# Patient Record
Sex: FEMALE | Race: ASIAN | NOT HISPANIC OR LATINO
[De-identification: names, ages, dates, MRNs, and addresses within clinical notes are randomized per-mention and may not be internally consistent; named-entity substitution may affect disease eponyms.]

---

## 2018-04-18 PROBLEM — Z00.00 ENCOUNTER FOR PREVENTIVE HEALTH EXAMINATION: Status: ACTIVE | Noted: 2018-04-18

## 2018-05-07 ENCOUNTER — RECORD ABSTRACTING (OUTPATIENT)
Age: 53
End: 2018-05-07

## 2018-05-18 ENCOUNTER — TRANSCRIPTION ENCOUNTER (OUTPATIENT)
Age: 53
End: 2018-05-18

## 2018-05-18 ENCOUNTER — APPOINTMENT (OUTPATIENT)
Dept: BREAST CENTER | Facility: CLINIC | Age: 53
End: 2018-05-18
Payer: COMMERCIAL

## 2018-05-18 VITALS
HEART RATE: 65 BPM | WEIGHT: 115 LBS | DIASTOLIC BLOOD PRESSURE: 70 MMHG | SYSTOLIC BLOOD PRESSURE: 103 MMHG | BODY MASS INDEX: 19.63 KG/M2 | HEIGHT: 64 IN

## 2018-05-18 PROCEDURE — 99215 OFFICE O/P EST HI 40 MIN: CPT

## 2018-05-18 RX ORDER — OLOPATADINE HYDROCHLORIDE 2 MG/ML
0.2 SOLUTION OPHTHALMIC
Qty: 2 | Refills: 0 | Status: DISCONTINUED | COMMUNITY
Start: 2018-05-11

## 2018-05-18 RX ORDER — OLOPATADINE HCL 1 MG/ML
0.1 SOLUTION/ DROPS OPHTHALMIC
Qty: 5 | Refills: 0 | Status: DISCONTINUED | COMMUNITY
Start: 2018-05-11

## 2019-05-17 ENCOUNTER — APPOINTMENT (OUTPATIENT)
Dept: BREAST CENTER | Facility: CLINIC | Age: 54
End: 2019-05-17
Payer: COMMERCIAL

## 2019-05-17 VITALS
HEIGHT: 64 IN | HEART RATE: 71 BPM | DIASTOLIC BLOOD PRESSURE: 68 MMHG | WEIGHT: 115 LBS | SYSTOLIC BLOOD PRESSURE: 95 MMHG | BODY MASS INDEX: 19.63 KG/M2

## 2019-05-17 DIAGNOSIS — Z98.890 OTHER SPECIFIED POSTPROCEDURAL STATES: ICD-10-CM

## 2019-05-17 PROCEDURE — 99215 OFFICE O/P EST HI 40 MIN: CPT

## 2019-05-17 NOTE — PHYSICAL EXAM
[Atraumatic] : atraumatic [Supple] : supple [Normocephalic] : normocephalic [Examined in the supine and seated position] : examined in the supine and seated position [No Supraclavicular Adenopathy] : no supraclavicular adenopathy [No dominant masses] : no dominant masses in right breast  [No dominant masses] : no dominant masses left breast [No Nipple Discharge] : no left nipple discharge [No Nipple Retraction] : no left nipple retraction [No Axillary Lymphadenopathy] : no right axillary lymphadenopathy [No Edema] : no edema [No Rashes] : no rashes [No Ulceration] : no ulceration [de-identified] : healed surgical excision transverse 12:00

## 2019-05-17 NOTE — HISTORY OF PRESENT ILLNESS
[FreeTextEntry1] : This is a 55 yo female referred for high risk. Patient is s/p left retroareolar 7:00  usd guided biopsy (coil clip) on 07/18/2017. Pathology showed benign breast tissue with benign sclerotic ducts showing focal surrounding chronic inflammation. Patient is s/p left breast biopsy in 2001 ([facility]). Patient states this was benign. Pathology report that available at this time. Patient has a family history of mother with BCA (>50) and uterine CA, and also a paternal aunt with BCA (>50).\par \par Patient has no breast complaints today.  \par \par She does SBE. \par She has not noticed a change in her breast or a breast lump.\par She has not noticed a change in her nipple or nipple area.\par She has not noticed a change in the skin of the breast.\par She is not experiencing nipple discharge.\par She is not experiencing breast pain.\par She has not noticed a lump or lymph node under the armpit. \par \par BREAST CANCER RISK FACTORS\par Menarche: 15\par Date of LMP: 01/2017 \par Menopause: post \par Grav:   1   Para: 1\par Age at first live birth: 38\par Nursed: No \par Hysterectomy: No \par Oophorectomy: No \par OCP: No \par HRT: No \par Last pap/pelvic exam: 6/2018 WNL \par Related family history: mother BCA  >50& uterine CA, paternal aunt BCA  >50\par Ashkenazi: No \par Mastery risk assessment: BRCAPRO 6.3%TCv6 19.5%TCv7 26.7% Debbie 22.3% Oliver  9.2%\par BRCA testing: No \par Bra size:  A\par \par Last mammogram:   7/20/2018                           Location: [place] \par Report reviewed.                                 Images reviewed.\par Results: Birads 2 \par Heterogenously dense breast tissue with no mammographic evidence of malignancy. \par \par Last ultrasound: 7/20/2018    Location: [place] \par Report reviewed.                                 Images reviewed.\par Results: Birads 2\par Interval resolution of the previously biopsied hypoechoic lesion at 7:00 in the left retroareolar region. \par \par Last MRI:   never

## 2019-05-17 NOTE — ASSESSMENT
[FreeTextEntry1] : I reviewed her risk status and she is high risk given her Debbie model greater than 20%. I reviewed our high risk screening program including 6 month CBE (which we can alternate with her gyn visit) and 6 month breast imaging (mg/sono alternating with MRI). I reviewed that screening MRI is controversial and some institutions do not offer them given some studies showing no overall survival benefit and high false positive rate. She is interested in MRI, but will think about it. And would like to do annual MG/MRI.  \par She is scheduled for this JUL 2019\par recalculate risk at 57yo\par If negative, she will see gyn 6 months and me in 1 year.\par  I believe I was able to answer all of her questions to her satisfaction.  She knows to call or return sooner should any changes or concerns arise.\par

## 2020-07-17 ENCOUNTER — APPOINTMENT (OUTPATIENT)
Dept: BREAST CENTER | Facility: CLINIC | Age: 55
End: 2020-07-17
Payer: COMMERCIAL

## 2020-07-17 VITALS
HEART RATE: 64 BPM | BODY MASS INDEX: 19.63 KG/M2 | HEIGHT: 64 IN | WEIGHT: 115 LBS | SYSTOLIC BLOOD PRESSURE: 98 MMHG | DIASTOLIC BLOOD PRESSURE: 60 MMHG

## 2020-07-17 DIAGNOSIS — Z80.1 FAMILY HISTORY OF MALIGNANT NEOPLASM OF TRACHEA, BRONCHUS AND LUNG: ICD-10-CM

## 2020-07-17 DIAGNOSIS — Z12.31 ENCOUNTER FOR SCREENING MAMMOGRAM FOR MALIGNANT NEOPLASM OF BREAST: ICD-10-CM

## 2020-07-17 PROCEDURE — 99215 OFFICE O/P EST HI 40 MIN: CPT

## 2020-07-17 NOTE — ASSESSMENT
[FreeTextEntry1] : I reviewed her risk status and she is high risk given her Debbie model greater than 20%. I reviewed our high risk screening program including 6 month CBE (which we can alternate with her gyn visit) and 6 month breast imaging (mg/sono alternating with MRI). I reviewed that screening MRI is controversial and some institutions do not offer them given some studies showing no overall survival benefit and high false positive rate. She is interested in MRI, but will think about it. And would like to do annual MG/MRI.  \par She is scheduled for this JUL 2020\par recalculate risk at 55yo\par If negative, she will see gyn 6 months and me in 1 year.\par  I believe I was able to answer all of her questions to her satisfaction.  She knows to call or return sooner should any changes or concerns arise.\par

## 2020-07-17 NOTE — HISTORY OF PRESENT ILLNESS
[FreeTextEntry1] : This is a 56 yo female referred for high risk. Patient is s/p left retroareolar 7:00  usd guided biopsy (coil clip) on 07/18/2017. Pathology showed benign breast tissue with benign sclerotic ducts showing focal surrounding chronic inflammation. Patient is s/p left breast biopsy in 2001 ([facility]). Patient states this was benign. Pathology report that available at this time. Patient has a family history of mother with BCA (>50) and uterine CA, and also a paternal aunt with BCA (>50).\par \par Patient has no breast complaints today.  \par \par She does SBE. \par She has not noticed a change in her breast or a breast lump.\par She has not noticed a change in her nipple or nipple area.\par She has not noticed a change in the skin of the breast.\par She is not experiencing nipple discharge.\par She is not experiencing breast pain.\par She has not noticed a lump or lymph node under the armpit. \par \par BREAST CANCER RISK FACTORS\par Menarche: 15\par Date of LMP: 01/2017 \par Menopause: post \par Grav:   1   Para: 1\par Age at first live birth: 38\par Nursed: No \par Hysterectomy: No \par Oophorectomy: No \par OCP: No \par HRT: No \par Last pap/pelvic exam: 6/2019 WNL \par Related family history: mother BCA  >50& uterine CA, paternal aunt BCA  >50\par Ashkenazi: No \par Mastery risk assessment: BRCAPRO 6.3%TCv6 19.5%TCv7 26.7% Debbie 22.3% Oliver  9.2%\par BRCA testing: No \par Bra size:  A\par \par Last mammogram:   7/19/2019                           Location: [place] \par Report reviewed.                                 Images reviewed.\par Results: Birads 2 \par Heterogenously dense breast tissue with no mammographic evidence of malignancy. \par \par Last ultrasound: 7/19/2019    Location: [place] \par Report reviewed.                                 Images reviewed.\par Results: Birads 2\par Heterogeneously dense breast tissue with no mammographic evidence of malignancy.\par \par Last MRI:   7/19/2019                         Location: [place] \par Results: BIRADS 2\par No suspicious MRI abnormality.

## 2020-07-17 NOTE — PHYSICAL EXAM
[Atraumatic] : atraumatic [Normocephalic] : normocephalic [No Supraclavicular Adenopathy] : no supraclavicular adenopathy [Supple] : supple [No dominant masses] : no dominant masses left breast [Examined in the supine and seated position] : examined in the supine and seated position [No dominant masses] : no dominant masses in right breast  [No Nipple Retraction] : no left nipple retraction [No Nipple Discharge] : no left nipple discharge [No Axillary Lymphadenopathy] : no left axillary lymphadenopathy [No Edema] : no edema [No Rashes] : no rashes [No Ulceration] : no ulceration [Symmetrical] : symmetrical [de-identified] : healed surgical excision transverse 12:00 [de-identified] : right nipple has less projection than left

## 2020-12-23 PROBLEM — Z12.31 ENCOUNTER FOR SCREENING MAMMOGRAM FOR MALIGNANT NEOPLASM OF BREAST: Status: RESOLVED | Noted: 2018-05-07 | Resolved: 2020-12-23

## 2021-05-06 ENCOUNTER — NON-APPOINTMENT (OUTPATIENT)
Age: 56
End: 2021-05-06

## 2021-07-23 ENCOUNTER — APPOINTMENT (OUTPATIENT)
Dept: BREAST CENTER | Facility: CLINIC | Age: 56
End: 2021-07-23
Payer: COMMERCIAL

## 2021-07-23 VITALS
BODY MASS INDEX: 19.63 KG/M2 | DIASTOLIC BLOOD PRESSURE: 76 MMHG | SYSTOLIC BLOOD PRESSURE: 128 MMHG | HEART RATE: 65 BPM | WEIGHT: 115 LBS | HEIGHT: 64 IN

## 2021-07-23 DIAGNOSIS — Z12.39 ENCOUNTER FOR OTHER SCREENING FOR MALIGNANT NEOPLASM OF BREAST: ICD-10-CM

## 2021-07-23 PROCEDURE — 99072 ADDL SUPL MATRL&STAF TM PHE: CPT

## 2021-07-23 PROCEDURE — 99214 OFFICE O/P EST MOD 30 MIN: CPT

## 2021-07-23 NOTE — PHYSICAL EXAM
[Normocephalic] : normocephalic [Atraumatic] : atraumatic [Supple] : supple [No Supraclavicular Adenopathy] : no supraclavicular adenopathy [Examined in the supine and seated position] : examined in the supine and seated position [Symmetrical] : symmetrical [No dominant masses] : no dominant masses in right breast  [No dominant masses] : no dominant masses left breast [No Nipple Retraction] : no left nipple retraction [No Nipple Discharge] : no left nipple discharge [No Axillary Lymphadenopathy] : no left axillary lymphadenopathy [No Edema] : no edema [No Rashes] : no rashes [No Ulceration] : no ulceration [de-identified] : healed surgical excision transverse 12:00 [de-identified] : right nipple has less projection than left

## 2021-07-23 NOTE — HISTORY OF PRESENT ILLNESS
[FreeTextEntry1] : This is a 55 yo female referred for high risk. Patient is s/p left retroareolar 7:00  usd guided biopsy (coil clip) on 07/18/2017. Pathology showed benign breast tissue with benign sclerotic ducts showing focal surrounding chronic inflammation. Patient is s/p left breast biopsy in 2001 ([facility]). Patient states this was benign. Pathology report that available at this time. Patient has a family history of mother with BCA (>50) and uterine CA, and also a paternal aunt with BCA (>50).\par \par Patient has no breast complaints today.  She completed Pfizer left April 2021. Her daughter is starting at [employer] [facility] in the Fall\par \par She does SBE. \par She has not noticed a change in her breast or a breast lump.\par She has not noticed a change in her nipple or nipple area.\par She has not noticed a change in the skin of the breast.\par She is not experiencing nipple discharge.\par She is not experiencing breast pain.\par She has not noticed a lump or lymph node under the armpit. \par \par BREAST CANCER RISK FACTORS\par Menarche: 15\par Date of LMP: 01/2017 \par Menopause: post \par Grav:   1   Para: 1\par Age at first live birth: 38\par Nursed: No \par Hysterectomy: No \par Oophorectomy: No \par OCP: No \par HRT: No \par Last pap/pelvic exam: 6/2019 WNL \par Related family history: mother BCA  >50& uterine CA, paternal aunt BCA  >50\par Ashkenazi: No \par Mastery risk assessment: BRCAPRO 6.3%TCv6 19.5%TCv7 26.7% Debbie 22.3% Oliver  9.2%\par BRCA testing: No \par Bra size:  A\par \par Last mammogram:   8/26/2020             Location: [place] \par Report reviewed.                                 Images reviewed.\par Results: Birads 2 \par Heterogenously dense breast tissue with no mammographic evidence of malignancy. \par \par Last ultrasound: 7/20/2018    Location: [place] \par Report reviewed.                                 Images reviewed.\par Results: Birads 2\par Heterogeneously dense breast tissue with no mammographic evidence of malignancy.\par \par Last MRI:   8/26/2020                         Location[place] \par Results: BIRADS 2\par No suspicious MRI findings.

## 2021-07-23 NOTE — ASSESSMENT
[FreeTextEntry1] : I reviewed her risk status and she is high risk given her Debbie model greater than 20%. I reviewed our high risk screening program including 6 month CBE (which we can alternate with her gyn visit) and 6 month breast imaging (mg/sono alternating with MRI). I reviewed that screening MRI is controversial and some institutions do not offer them given some studies showing no overall survival benefit and high false positive rate. She is interested in MRI, but will think about it. And would like to do annual MG/MRI.  \par She is scheduled for this MRI 8/2021\par recalculate risk at 57yo\par If negative, she will see gyn 6 months and me in 1 year.\par  I believe I was able to answer all of her questions to her satisfaction.  She knows to call or return sooner should any changes or concerns arise.\par

## 2021-09-23 ENCOUNTER — NON-APPOINTMENT (OUTPATIENT)
Age: 56
End: 2021-09-23

## 2022-09-16 ENCOUNTER — APPOINTMENT (OUTPATIENT)
Dept: BREAST CENTER | Facility: CLINIC | Age: 57
End: 2022-09-16

## 2022-09-16 ENCOUNTER — NON-APPOINTMENT (OUTPATIENT)
Age: 57
End: 2022-09-16

## 2022-09-16 VITALS
HEART RATE: 58 BPM | DIASTOLIC BLOOD PRESSURE: 67 MMHG | HEIGHT: 64 IN | BODY MASS INDEX: 19.63 KG/M2 | WEIGHT: 115 LBS | SYSTOLIC BLOOD PRESSURE: 102 MMHG

## 2022-09-16 DIAGNOSIS — R92.8 OTHER ABNORMAL AND INCONCLUSIVE FINDINGS ON DIAGNOSTIC IMAGING OF BREAST: ICD-10-CM

## 2022-09-16 DIAGNOSIS — Z12.31 ENCOUNTER FOR SCREENING MAMMOGRAM FOR MALIGNANT NEOPLASM OF BREAST: ICD-10-CM

## 2022-09-16 PROCEDURE — 99214 OFFICE O/P EST MOD 30 MIN: CPT

## 2022-09-16 NOTE — PHYSICAL EXAM
[Normocephalic] : normocephalic [Atraumatic] : atraumatic [Supple] : supple [No Supraclavicular Adenopathy] : no supraclavicular adenopathy [Examined in the supine and seated position] : examined in the supine and seated position [Symmetrical] : symmetrical [No dominant masses] : no dominant masses in right breast  [No dominant masses] : no dominant masses left breast [No Nipple Retraction] : no left nipple retraction [No Nipple Discharge] : no left nipple discharge [No Axillary Lymphadenopathy] : no left axillary lymphadenopathy [No Edema] : no edema [No Rashes] : no rashes [No Ulceration] : no ulceration [de-identified] : healed surgical excision transverse 12:00 [de-identified] : right nipple has less projection than left

## 2022-09-16 NOTE — HISTORY OF PRESENT ILLNESS
[FreeTextEntry1] : This is a 56 yo female followed for high risk. Patient is s/p left retroareolar 7:00  usd guided biopsy (coil clip) on 07/18/2017. Pathology showed benign breast tissue with benign sclerotic ducts showing focal surrounding chronic inflammation. Patient is s/p left breast biopsy in 2001 ([facility]). Patient states this was benign. Pathology report that available at this time. Patient has a family history of mother with BCA (>50) and uterine CA, and also a paternal aunt with BCA (>50).\par \par She completed Pfizer left April 2021. Patient denies any new medical history. No new family history of cancer. Patient has no breast complaints today. Patient is due for her annual breast screening.  Patient has been doing well with the MRI/mammogram, however is concerned about prolonged screening with MRIs. Patient's last physical was last year with Dr. Finley - [facility]. \par \par She does SBE. \par She has not noticed a change in her breast or a breast lump.\par She has not noticed a change in her nipple or nipple area.\par She has not noticed a change in the skin of the breast.\par She is not experiencing nipple discharge.\par She is not experiencing breast pain.\par She has not noticed a lump or lymph node under the armpit. \par \par BREAST CANCER RISK FACTORS\par Menarche: 15\par Date of LMP: 01/2017 \par Menopause: post \par Grav:   1   Para: 1\par Age at first live birth: 38\par Nursed: No \par Hysterectomy: No \par Oophorectomy: No \par OCP: No \par HRT: No \par Last pap/pelvic exam: 6/2019 WNL \par Related family history: mother BCA  >50& uterine CA, paternal aunt BCA  >50\par Ashkenazi: No \par Mastery risk assessment: BRCAPRO 6.8% TCv7 24.1 TCv8 21.5% Debbie 20.7% Oliver  7.8%\par BRCA testing: No \par Bra size:  A\par \par Last mammogram: 9/10/2021             Location: [place]\par Report reviewed.                                 Images reviewed.\par Results: Birads 2 \par Heterogenously dense breast tissue with no mammographic evidence of malignancy. \par \par Last ultrasound: 7/20/2018    Location: [place] \par Report reviewed.                                 Images reviewed.\par Results: Birads 2\par Heterogeneously dense breast tissue with no mammographic evidence of malignancy.\par \par Last MRI:   9/10/2021                    Location: [place] \par Results: BIRADS 2\par No suspicious MRI findings.

## 2022-09-16 NOTE — ASSESSMENT
[FreeTextEntry1] : I reviewed her risk status and she is high risk given her Debbie model greater than 20%.  Patient is amenable to continuing the MRI for now. Reviewed MRI protocol, risks, etc.\par Patient would like to continue the MRI/mammogram yearly imaging - due for Sept 2022\par Will recheck Mastery in next year, but discussed how it may not decrease until she is 61\par Patient will continue 6 month CBE (which we can alternate with her gyn visit). \par \par She will see gyn 6 months and me in 1 year.\par I believe I was able to answer all of her questions to her satisfaction.  She knows to call or return sooner should any changes or concerns arise.\par

## 2022-09-26 ENCOUNTER — RESULT REVIEW (OUTPATIENT)
Age: 57
End: 2022-09-26

## 2022-10-05 ENCOUNTER — NON-APPOINTMENT (OUTPATIENT)
Age: 57
End: 2022-10-05

## 2022-10-09 ENCOUNTER — RESULT REVIEW (OUTPATIENT)
Age: 57
End: 2022-10-09

## 2022-10-10 ENCOUNTER — NON-APPOINTMENT (OUTPATIENT)
Age: 57
End: 2022-10-10

## 2023-05-17 ENCOUNTER — NON-APPOINTMENT (OUTPATIENT)
Age: 58
End: 2023-05-17

## 2023-06-08 ENCOUNTER — APPOINTMENT (OUTPATIENT)
Dept: DERMATOLOGY | Facility: CLINIC | Age: 58
End: 2023-06-08
Payer: COMMERCIAL

## 2023-06-08 VITALS — HEIGHT: 64 IN | BODY MASS INDEX: 19.63 KG/M2 | WEIGHT: 115 LBS

## 2023-06-08 DIAGNOSIS — D23.9 OTHER BENIGN NEOPLASM OF SKIN, UNSPECIFIED: ICD-10-CM

## 2023-06-08 DIAGNOSIS — Z12.83 ENCOUNTER FOR SCREENING FOR MALIGNANT NEOPLASM OF SKIN: ICD-10-CM

## 2023-06-08 DIAGNOSIS — L57.8 OTHER SKIN CHANGES DUE TO CHRONIC EXPOSURE TO NONIONIZING RADIATION: ICD-10-CM

## 2023-06-08 DIAGNOSIS — D18.01 HEMANGIOMA OF SKIN AND SUBCUTANEOUS TISSUE: ICD-10-CM

## 2023-06-08 PROCEDURE — 99203 OFFICE O/P NEW LOW 30 MIN: CPT

## 2023-06-08 RX ORDER — TRETINOIN 0.25 MG/G
0.03 CREAM TOPICAL
Qty: 1 | Refills: 6 | Status: ACTIVE | COMMUNITY
Start: 2023-06-08 | End: 1900-01-01

## 2023-06-08 NOTE — HISTORY OF PRESENT ILLNESS
[FreeTextEntry1] : Moles [de-identified] : # Mole/skin check \par Concerns today:brown spots throughout body\par Sunscreen use:sometimes\par Hx of blistering sun burns:no\par Hx of immunosuppression:no\par Personal history of skin cancer:no\par Family history of skin cancer:paternal grandfather do not remember what type\par \par Also requesting Rx for tretinoin

## 2023-06-08 NOTE — ASSESSMENT
[FreeTextEntry1] : # DPNs\par - Benign, reassured\par - Discussed cosmetic LN if desired; /session\par - Test spot performed to mid back lesion, setting 0.8 \par - R/B/E discussed including risk of dyspigmentation and scarring\par \par # Cherry angiomas\par # Dermatofibroma\par - Benign, reassured\par \par # Photoaging\par - Tretinoin 0.025% three times weekly\par - Discussed potential irritation/dryness\par - Advised to wear moisturizer\par \par # Skin cancer screening\par - A complete skin exam was performed with exception of toenails due to polish\par - No concerning lesions identified\par - Photoprotection was discussed including sunscreen\par - ABCDEs of melanoma reviewed\par - Call for new or changing lesions\par

## 2023-06-08 NOTE — PHYSICAL EXAM
[Alert] : alert [Oriented x 3] : ~L oriented x 3 [Full Body Skin Exam Performed] : performed [FreeTextEntry3] : Pt consented to examination of external genitalia and buttocks\par Opaque polish on toenails\par \par Small erythematous papules scattered\par Stuck on brown papules on neck and back scattered\par Brown firm papule on the L shin\par \par

## 2023-06-29 ENCOUNTER — APPOINTMENT (OUTPATIENT)
Dept: DERMATOLOGY | Facility: CLINIC | Age: 58
End: 2023-06-29
Payer: COMMERCIAL

## 2023-06-29 DIAGNOSIS — L82.1 OTHER SEBORRHEIC KERATOSIS: ICD-10-CM

## 2023-06-29 PROCEDURE — D0053: CPT

## 2023-06-29 NOTE — ASSESSMENT
[FreeTextEntry1] : # DPN/SK\par Informed consent obtained. The elective and cosmetic nature of this procedure was discussed. Risks/benefits/alternatives discussed including but not limited to risk of permanent scar and/or dyspigmentation, lack of efficacy and need for repeat treatments. Site confirmed. After topical anesthetic applied for 10 minutes each lesion electrodesiccated at low voltage (0.9-1). Petrolatum applied. Discussed wound care instructions including sun protection. Patient tolerated well with no immediate complications.\par \par Charge $250\par

## 2023-07-13 ENCOUNTER — APPOINTMENT (OUTPATIENT)
Dept: GASTROENTEROLOGY | Facility: CLINIC | Age: 58
End: 2023-07-13
Payer: COMMERCIAL

## 2023-07-13 VITALS
HEIGHT: 64 IN | HEART RATE: 59 BPM | RESPIRATION RATE: 16 BRPM | OXYGEN SATURATION: 99 % | BODY MASS INDEX: 19.63 KG/M2 | WEIGHT: 115 LBS | SYSTOLIC BLOOD PRESSURE: 135 MMHG | DIASTOLIC BLOOD PRESSURE: 81 MMHG

## 2023-07-13 DIAGNOSIS — R10.12 LEFT UPPER QUADRANT PAIN: ICD-10-CM

## 2023-07-13 PROCEDURE — 99204 OFFICE O/P NEW MOD 45 MIN: CPT

## 2023-07-13 NOTE — HISTORY OF PRESENT ILLNESS
[FreeTextEntry1] : 59 yo F here for LUQ abd pain.\par \par one year ago noticed abdominal pain\par LUQ region\par occurs randomly- not assoc with eating or BMs\par only lasts for a few seconds, 5-10 seconds, then its gone. \par occurs every 2 months, but when it hurts its sharp 7/10 . \par \par no n/v/ gerd\par no changes in BMs, no blood in stool\par \par \par Last colonoscopy: age 51 - no polyps\par \par Soc: no tobacco or significant EtOH\par \par Family Hx: no significant GI family history including colon cancer, stomach cancer, IBD, celiac\par \par ROS:\par constitutional: no weight loss, fevers\par ENT: no deafness\par Eyes: no blindness\par Neck: no lymphadenopathy\par Chest: no shortness of breath, no cough\par Cardiac: no chest pain, no palpitations\par Vascular: no leg swelling\par GI: no abdominal pain, nausea, vomiting, diarrhea, constipation, rectal bleeding, melena, dysphagia,  unless otherwise noted in HPI\par : no dysuria, dark urine\par Skin: no rashes, lesions, jaundice\par Heme: no bleeding\par Endocrine: no DM  unless otherwise stated in HPI\par \par Physical Exam: (VS noted below)\par General: alert, comfortable, in no acute distress\par Eyes: normal sclera, anicteric\par Neck: normal, supple, no neck mass\par Pulm: no respiratory distress, clear to auscultation bilaterally \par Heart: RRR, normal S1 S2\par Abd: Soft, non-tender, non-distended, normal bowel sounds, no appreciable hepatosplenomegaly, no masses palpated\par Rectal: deferred\par Ext: warm and well perfused, no edema\par Skin: no rashes, no juandice\par Neuro: alert, grossly nonfocal\par \par Labs/imaging/prior endoscopic results were reviewed to the extent available and pertinent findings noted in HPI\par

## 2023-07-13 NOTE — ASSESSMENT
[FreeTextEntry1] : 57 yo F presents with one year intermittent LUQ abd pain,  occurs only every few months and lasts for 5-10 seconds. no exacerbating and relieving factors. no other digestive complaints.\par \par - labs \par - h pylori bt - if neg, she agrees to try 1-2 mo PPI \par - abd US\par - consider egd/colonoscopy \par \par f/u 6-8 weeks\par \par \par \par

## 2023-08-24 ENCOUNTER — APPOINTMENT (OUTPATIENT)
Dept: DERMATOLOGY | Facility: CLINIC | Age: 58
End: 2023-08-24

## 2024-02-26 ENCOUNTER — APPOINTMENT (OUTPATIENT)
Dept: BREAST CENTER | Facility: CLINIC | Age: 59
End: 2024-02-26
Payer: COMMERCIAL

## 2024-02-26 VITALS
HEIGHT: 64 IN | DIASTOLIC BLOOD PRESSURE: 67 MMHG | SYSTOLIC BLOOD PRESSURE: 109 MMHG | HEART RATE: 60 BPM | WEIGHT: 115 LBS | BODY MASS INDEX: 19.63 KG/M2

## 2024-02-26 DIAGNOSIS — Z80.3 FAMILY HISTORY OF MALIGNANT NEOPLASM OF BREAST: ICD-10-CM

## 2024-02-26 DIAGNOSIS — Z91.89 OTHER SPECIFIED PERSONAL RISK FACTORS, NOT ELSEWHERE CLASSIFIED: ICD-10-CM

## 2024-02-26 DIAGNOSIS — Z80.42 FAMILY HISTORY OF MALIGNANT NEOPLASM OF PROSTATE: ICD-10-CM

## 2024-02-26 DIAGNOSIS — D24.2 BENIGN NEOPLASM OF LEFT BREAST: ICD-10-CM

## 2024-02-26 DIAGNOSIS — Z80.41 FAMILY HISTORY OF MALIGNANT NEOPLASM OF OVARY: ICD-10-CM

## 2024-02-26 DIAGNOSIS — Z80.49 FAMILY HISTORY OF MALIGNANT NEOPLASM OF OTHER GENITAL ORGANS: ICD-10-CM

## 2024-02-26 DIAGNOSIS — R92.2 INCONCLUSIVE MAMMOGRAM: ICD-10-CM

## 2024-02-26 PROCEDURE — 99214 OFFICE O/P EST MOD 30 MIN: CPT

## 2024-02-26 NOTE — PHYSICAL EXAM
[Normocephalic] : normocephalic [Atraumatic] : atraumatic [No Supraclavicular Adenopathy] : no supraclavicular adenopathy [Supple] : supple [Examined in the supine and seated position] : examined in the supine and seated position [Symmetrical] : symmetrical [No dominant masses] : no dominant masses in right breast  [No dominant masses] : no dominant masses left breast [No Nipple Retraction] : no left nipple retraction [No Axillary Lymphadenopathy] : no right axillary lymphadenopathy [No Nipple Discharge] : no left nipple discharge [No Edema] : no edema [No Rashes] : no rashes [No Ulceration] : no ulceration [de-identified] : healed surgical excision transverse 12:00 [de-identified] : right nipple has less projection than left

## 2024-02-26 NOTE — HISTORY OF PRESENT ILLNESS
[FreeTextEntry1] : This is a 57 yo female followed for high risk. Patient is s/p left retroareolar 7:00  usd guided biopsy (coil clip) on 07/18/2017. Pathology showed benign breast tissue with benign sclerotic ducts showing focal surrounding chronic inflammation. Patient is s/p left breast biopsy in 2001 ([facility]). Patient states this was benign. Pathology report that available at this time. Patient has a family history of mother with BCA (>50) and uterine CA, and also a paternal aunt with BCA (>50).  She completed Pfizer left April 2021. Patient denies any new medical history. No new family history of cancer. Patient has no breast complaints today.  Patient has been doing well with the MRI/mammogram, however is concerned about prolonged screening with MRIs. Patient's last physical was last week with Dr. Finley - [facility].   She does SBE.  She has not noticed a change in her breast or a breast lump. She has not noticed a change in her nipple or nipple area. She has not noticed a change in the skin of the breast. She is not experiencing nipple discharge. She is not experiencing breast pain. She has not noticed a lump or lymph node under the armpit.   BREAST CANCER RISK FACTORS Menarche: 15 Date of LMP: 01/2017  Menopause: post  Grav:   1   Para: 1 Age at first live birth: 38 Nursed: No  Hysterectomy: No  Oophorectomy: No  OCP: No  HRT: No  Last pap/pelvic exam: 2023 WNL  Related family history: mother BCA  >50& uterine CA, paternal aunt BCA  >50, paternal niece ovarian cancer @38, pat nephew testicular cancer Ashkenazi: No  Mastery risk assessment: BRCAPRO 6.8% TCv7 24.1 TCv8 21.5% Debbie 20.7% Oliver  7.8% BRCA testing: No  Bra size:  A  Last mammogram: 9/27/2022             Location: [facility] Report reviewed.                                 Images reviewed. Results: Birads 2  Heterogenously dense breast tissue with no mammographic evidence of malignancy.   Last ultrasound: 7/20/2018    Location: [place]  Report reviewed.                                 Images reviewed. Results: Birads 2 Heterogeneously dense breast tissue with no mammographic evidence of malignancy.  Last MRI:   10/10/2022                 Location: [facility] Results: BIRADS 2 No suspicious MRI findings.

## 2024-02-26 NOTE — CONSULT LETTER
[Dear  ___] : Dear ~ZAKIA, [Courtesy Letter:] : I had the pleasure of seeing your patient, [unfilled], in my office today. [Please see my note below.] : Please see my note below. [Consult Closing:] : Thank you very much for allowing me to participate in the care of this patient.  If you have any questions, please do not hesitate to contact me. [Sincerely,] : Sincerely, [FreeTextEntry3] : Tish Chu [place] [address]

## 2024-02-26 NOTE — ASSESSMENT
[FreeTextEntry1] : I reviewed her risk status and she is high risk given her Debbie model greater than 20%.  Patient is amenable to continuing the MRI for now. Reviewed MRI protocol, risks, etc. Patient would like to continue the MRI/mammogram yearly imaging asap Will recheck Mastery next year, but discussed how it may not decrease until she is 61 Patient will continue 6 month CBE (which we can alternate with her gyn visit).   She will see gyn 6 months and me in 1 year. I believe I was able to answer all of her questions to her satisfaction.  She knows to call or return sooner should any changes or concerns arise.

## 2024-03-07 ENCOUNTER — NON-APPOINTMENT (OUTPATIENT)
Age: 59
End: 2024-03-07

## 2024-03-21 ENCOUNTER — RESULT REVIEW (OUTPATIENT)
Age: 59
End: 2024-03-21

## 2024-11-18 ENCOUNTER — APPOINTMENT (OUTPATIENT)
Dept: DERMATOLOGY | Facility: CLINIC | Age: 59
End: 2024-11-18
Payer: COMMERCIAL

## 2024-11-18 VITALS — BODY MASS INDEX: 19.63 KG/M2 | WEIGHT: 115 LBS | HEIGHT: 64 IN

## 2024-11-18 DIAGNOSIS — L57.8 OTHER SKIN CHANGES DUE TO CHRONIC EXPOSURE TO NONIONIZING RADIATION: ICD-10-CM

## 2024-11-18 DIAGNOSIS — L21.9 SEBORRHEIC DERMATITIS, UNSPECIFIED: ICD-10-CM

## 2024-11-18 DIAGNOSIS — L82.1 OTHER SEBORRHEIC KERATOSIS: ICD-10-CM

## 2024-11-18 DIAGNOSIS — L70.0 ACNE VULGARIS: ICD-10-CM

## 2024-11-18 PROCEDURE — 99204 OFFICE O/P NEW MOD 45 MIN: CPT

## 2024-11-18 RX ORDER — KETOCONAZOLE 20 MG/ML
2 SUSPENSION TOPICAL
Qty: 1 | Refills: 5 | Status: ACTIVE | COMMUNITY
Start: 2024-11-18 | End: 1900-01-01

## 2025-03-19 ENCOUNTER — NON-APPOINTMENT (OUTPATIENT)
Age: 60
End: 2025-03-19

## 2025-03-24 ENCOUNTER — APPOINTMENT (OUTPATIENT)
Dept: BREAST CENTER | Facility: CLINIC | Age: 60
End: 2025-03-24
Payer: COMMERCIAL

## 2025-03-24 VITALS
HEIGHT: 64 IN | DIASTOLIC BLOOD PRESSURE: 60 MMHG | HEART RATE: 62 BPM | WEIGHT: 120 LBS | BODY MASS INDEX: 20.49 KG/M2 | SYSTOLIC BLOOD PRESSURE: 99 MMHG

## 2025-03-24 DIAGNOSIS — Z80.41 FAMILY HISTORY OF MALIGNANT NEOPLASM OF OVARY: ICD-10-CM

## 2025-03-24 DIAGNOSIS — Z12.31 ENCOUNTER FOR SCREENING MAMMOGRAM FOR MALIGNANT NEOPLASM OF BREAST: ICD-10-CM

## 2025-03-24 DIAGNOSIS — Z98.890 OTHER SPECIFIED POSTPROCEDURAL STATES: ICD-10-CM

## 2025-03-24 DIAGNOSIS — Z80.42 FAMILY HISTORY OF MALIGNANT NEOPLASM OF PROSTATE: ICD-10-CM

## 2025-03-24 DIAGNOSIS — Z91.89 OTHER SPECIFIED PERSONAL RISK FACTORS, NOT ELSEWHERE CLASSIFIED: ICD-10-CM

## 2025-03-24 DIAGNOSIS — Z80.3 FAMILY HISTORY OF MALIGNANT NEOPLASM OF BREAST: ICD-10-CM

## 2025-03-24 DIAGNOSIS — Z80.49 FAMILY HISTORY OF MALIGNANT NEOPLASM OF OTHER GENITAL ORGANS: ICD-10-CM

## 2025-03-24 DIAGNOSIS — Z80.1 FAMILY HISTORY OF MALIGNANT NEOPLASM OF TRACHEA, BRONCHUS AND LUNG: ICD-10-CM

## 2025-03-24 DIAGNOSIS — R92.2 INCONCLUSIVE MAMMOGRAM: ICD-10-CM

## 2025-03-24 PROCEDURE — 99214 OFFICE O/P EST MOD 30 MIN: CPT

## 2025-03-27 ENCOUNTER — NON-APPOINTMENT (OUTPATIENT)
Age: 60
End: 2025-03-27

## 2025-04-28 ENCOUNTER — RESULT REVIEW (OUTPATIENT)
Age: 60
End: 2025-04-28

## 2025-04-29 ENCOUNTER — NON-APPOINTMENT (OUTPATIENT)
Age: 60
End: 2025-04-29